# Patient Record
Sex: FEMALE | Race: WHITE | NOT HISPANIC OR LATINO | Employment: UNEMPLOYED | URBAN - METROPOLITAN AREA
[De-identification: names, ages, dates, MRNs, and addresses within clinical notes are randomized per-mention and may not be internally consistent; named-entity substitution may affect disease eponyms.]

---

## 2017-02-22 PROBLEM — M25.552 LEFT HIP PAIN: Status: ACTIVE | Noted: 2017-02-22

## 2017-02-22 PROBLEM — G43.109 OCULAR MIGRAINE: Status: ACTIVE | Noted: 2017-02-22

## 2017-03-01 ENCOUNTER — TELEPHONE (OUTPATIENT)
Dept: NEUROLOGY | Facility: CLINIC | Age: 62
End: 2017-03-01

## 2017-03-01 NOTE — TELEPHONE ENCOUNTER
----- Message from Irene Blanco sent at 3/1/2017 12:43 PM CST -----  Contact: self   Patient wants to speak with a nurse regarding scheduling appointment please call back at [phone]

## 2017-05-31 PROBLEM — Z01.419 WELL WOMAN EXAM WITH ROUTINE GYNECOLOGICAL EXAM: Status: ACTIVE | Noted: 2017-05-31

## 2017-06-08 ENCOUNTER — OFFICE VISIT (OUTPATIENT)
Dept: NEUROLOGY | Facility: CLINIC | Age: 62
End: 2017-06-08
Payer: OTHER GOVERNMENT

## 2017-06-08 DIAGNOSIS — H53.9 VISUAL DISTURBANCE: Primary | ICD-10-CM

## 2017-06-08 PROCEDURE — 99999 PR PBB SHADOW E&M-EST. PATIENT-LVL III: CPT | Mod: PBBFAC,,, | Performed by: PSYCHIATRY & NEUROLOGY

## 2017-06-08 PROCEDURE — 99204 OFFICE O/P NEW MOD 45 MIN: CPT | Mod: S$PBB,,, | Performed by: PSYCHIATRY & NEUROLOGY

## 2017-06-08 PROCEDURE — 99213 OFFICE O/P EST LOW 20 MIN: CPT | Mod: PBBFAC,PN | Performed by: PSYCHIATRY & NEUROLOGY

## 2017-06-08 RX ORDER — LANOLIN ALCOHOL/MO/W.PET/CERES
400 CREAM (GRAM) TOPICAL 2 TIMES DAILY
Qty: 60 TABLET | Refills: 12 | Status: SHIPPED | OUTPATIENT
Start: 2017-06-08

## 2017-06-08 NOTE — LETTER
June 8, 2017      AALIYAH Ramirez Jr., MD  [address] [facility] [address]  [facility] [address]           [facility]  [address] [facility] [address] [place] [address]  Phone: [phone]  Fax: [phone]          Patient: Kim Jean-Baptiste   MR Number: [id number]   YOB: 1955   Date of Visit: 6/8/2017       Dear Dr. AALIYAH Ramirez Jr.:    Thank you for referring Kim Jean-Baptiste to me for evaluation. Attached you will find relevant portions of my assessment and plan of care.    If you have questions, please do not hesitate to call me. I look forward to following Kim Jean-Baptiste along with you.    Sincerely,    Noreen Bonner MD    Enclosure  CC:  No Recipients    If you would like to receive this communication electronically, please contact [email or web address] or [phone] to request more information on [employer] access.    For providers and/or their staff who would like to refer a patient to [facility], please contact us through our one-stop-shop provider referral line, [facility], at [phone].    If you feel you have received this communication in error or would no longer like to receive these types of communications, please e-mail [email or web address]

## 2017-06-08 NOTE — PROGRESS NOTES
Subjective:       Patient ID: Kim Jean-Baptiste is a 62 y.o. female.    Chief Complaint: Visual disturbance    HPI  The patient is a very pleasant 63 y/o female presenting with chief complaint of monocular scintillation followed by scotomata or just scintillating phenomena that started almost 3 years ago. She was diagnosed with Ocular Migraine. Lately, the episodes have become more frequent, initially they were occurring 1 every couple of months and now 2 to 4 per month, they last up to 15 minutes. Often precipitated by driving. She does not have associated pain. Family history of migraine affecting her sister and daughter. No imaging studies.    Review of Systems   Constitutional: Negative for activity change, appetite change, fatigue and fever.   HENT: Negative for congestion, dental problem, hearing loss, sinus pressure, tinnitus, trouble swallowing and voice change.    Eyes: Positive for visual disturbance. Negative for photophobia, pain and redness.   Respiratory: Negative for cough, chest tightness and shortness of breath.    Cardiovascular: Negative for chest pain, palpitations and leg swelling.   Gastrointestinal: Positive for diarrhea. Negative for abdominal pain, blood in stool, nausea and vomiting.   Endocrine: Negative for cold intolerance and heat intolerance.   Genitourinary: Negative for difficulty urinating, frequency, menstrual problem and urgency.   Musculoskeletal: Positive for back pain and myalgias. Negative for arthralgias, gait problem, joint swelling, neck pain and neck stiffness.   Skin: Negative.    Neurological: Positive for dizziness. Negative for tremors, seizures, syncope, facial asymmetry, speech difficulty, weakness, light-headedness, numbness and headaches.   Hematological: Negative for adenopathy. Does not bruise/bleed easily.   Psychiatric/Behavioral: Negative for agitation, behavioral problems, confusion, decreased concentration, self-injury, sleep disturbance and suicidal ideas.  The patient is nervous/anxious. The patient is not hyperactive.          Past Medical History:   Diagnosis Date    Abnormal mammogram, unspecified     Asymptomatic varicose veins     Female stress incontinence     Generalized anxiety disorder     Hypertension     Irritable bowel syndrome     Nontoxic uninodular goiter     Other acute reactions to stress     Pancreatitis     Rosacea     Rosacea      Past Surgical History:   Procedure Laterality Date    APPENDECTOMY       SECTION      GALLBLADDER SURGERY      right shoulder       Family History   Problem Relation Age of Onset    Cancer Father      prostate, renal cell carcinoma    Asthma Father     Cancer Mother      cervical     Cancer Sister      ovarian , uterine, melanoma, breast     Heart disease Brother      Social History     Social History    Marital status:      Spouse name: N/A    Number of children: N/A    Years of education: N/A     Occupational History    Not on file.     Social History Main Topics    Smoking status: Never Smoker    Smokeless tobacco: Not on file    Alcohol use 0.6 - 1.2 oz/week     1 - 2 Glasses of wine per week    Drug use: No    Sexual activity: Not on file     Other Topics Concern    Not on file     Social History Narrative    No narrative on file     Review of patient's allergies indicates:   Allergen Reactions    Innovar      Other reaction(s): hallucinated. no longer on market per pt    Morphine        Current Outpatient Prescriptions:     alprazolam (XANAX) 0.5 MG tablet, Take 1 tablet (0.5 mg total) by mouth 3 (three) times daily as needed for Anxiety., Disp: 270 tablet, Rfl: 1    atenolol (TENORMIN) 50 MG tablet, TAKE 1 TABLET DAILY, Disp: 90 tablet, Rfl: 3    baclofen (LIORESAL) 10 MG tablet, 1-2 four times a day as needed for muscle spasm, Disp: 120 tablet, Rfl: 3    calcium carbonate 1250 MG capsule, Take 1,250 mg by mouth 2 (two) times daily with meals., Disp: , Rfl:      dicyclomine (BENTYL) 10 MG capsule, Take 10 mg by mouth. 1 to 2 capsules  4 times daily prn GI upset, Disp: , Rfl:     escitalopram oxalate (LEXAPRO) 10 MG tablet, TAKE 1 TABLET DAILY, Disp: 90 tablet, Rfl: 3    fish oil-omega-3 fatty acids 300-1,000 mg capsule, Take 2 g by mouth once daily., Disp: , Rfl:     multivitamin capsule, Take 1 capsule by mouth once daily., Disp: , Rfl:     naproxen sodium (ANAPROX DS) 550 MG tablet, Take 1 tablet (550 mg total) by mouth every 12 (twelve) hours as needed., Disp: 60 tablet, Rfl: 2      Objective:      Vitals:    06/08/17 1055   Resp: 18   Temp: 98.6 °F (37 °C)     Body mass index is 27.47 kg/m².    Physical Exam      Constitutional:   She appears well-developed and well-nourished. She is well groomed    HENT:    Head: Atraumatic, oral and nasal mucosa intact  Eyes: Conjunctivae and EOM are normal. Pupils are equal, round, and reactive to light OU  Neck: Neck supple. No thyromegaly present  Cardiovascular: Normal rate and normal heart sounds  No murmur heard  Pulmonary/Chest: Effort normal and breath sounds normal  Musculoskeletal: Normal range of motion. No joint stiffness. No vertebral point tenderness  Skin: Skin is warm and dry  Psychiatric: Normal mood and affect     Neuro exam:    Mental status:  Awake, attentive, Alert, oriented to self, place, year and month  Language function is intact    Cranial Nerves:  Smell was not formally evaluated  Cranial Nerves II - XII: intact  Pursuits were smooth, normal saccades, no nystagmus OU  Funduscopic exam - disc were flat and pink, no exudates or hemorrhages OU  Motor - facial movement was symmetrical and normal     Palate moved well and was symmetrical with normal palatal and oral sensation  Tongue movements were full    Coordination:     Rapid alternating movements and rapid finger tapping - normal bilaterally  Finger to nose - normal and symmetric bilaterally   Heel to shin test - normal and symmetric bilaterally   Arm  roll - smooth and symmetric   No intentional or positional tremor.     Motor:  Normal muscle bulk and symmetry. No fasciculations were noted    No pronator drift  Strength 5/5 bilaterally except left  4/5    Reflexes:  Tendon reflexes were 2 + at biceps, triceps, brachioradialis, patellar, and Achilles bilaterally  On plantar stimulation toes were down going bilaterally  No clonus was noted     Sensory: Intact to light touch, pin prick in all extremities.   Gait: Romberg elicited mild swaying. Normal gait. Normal arm swing and turns. Fair tandem    Review of Data: CBC CMP TSH all normal          Assessment and Plan     Retinal migraine, characterized by repeated attacks of monocular scotomata alternating sides preceded by scintillation lasting less than one hour increasing in frequency and duration. Occasionally the onset may be abrupt and difficult to distinguish from amaurosis fugax, therefore I will obtain carotid ultrasound in addition to MRI of the brain W WO contrast.  I have personally had a lot of success with Magnesium in reducing both, the number of attacks and the duration as well. I will start her on Magnesium 400 mg BID  RTC after tests completed    I have discussed the side effects of the medications prescribed and the patient acknowledges understanding    Daylin Bonner M.D  Medical Director, Headache and Facial Pain  [facility]

## 2017-06-16 ENCOUNTER — TELEPHONE (OUTPATIENT)
Dept: NEUROLOGY | Facility: CLINIC | Age: 62
End: 2017-06-16

## 2017-06-16 VITALS — RESPIRATION RATE: 18 BRPM | HEIGHT: 69 IN

## 2017-06-16 NOTE — TELEPHONE ENCOUNTER
Spoke with the pt, she informed me that her weight nor her temp was taken at last visit but there was a weight and temp on her visit summary. I apologized and informed her it must have been entered in error. Verified the pt had only AVS with her name. I assured her this would not happen again and that I would speak to the person responsible. She verbalized understanding and was content.

## 2017-06-16 NOTE — TELEPHONE ENCOUNTER
----- Message from Freda Fajardo sent at 6/16/2017  2:59 PM CDT -----  Contact: Kim  Patient is calling regarding survey. States she would rather speak with a supervisor, [phone] thanks!

## 2017-06-19 ENCOUNTER — LAB VISIT (OUTPATIENT)
Dept: LAB | Facility: HOSPITAL | Age: 62
End: 2017-06-19
Attending: PSYCHIATRY & NEUROLOGY
Payer: OTHER GOVERNMENT

## 2017-06-19 DIAGNOSIS — H53.9 VISUAL DISTURBANCE: ICD-10-CM

## 2017-06-19 LAB
CREAT SERPL-MCNC: 0.9 MG/DL
EST. GFR  (AFRICAN AMERICAN): >60 ML/MIN/1.73 M^2
EST. GFR  (NON AFRICAN AMERICAN): >60 ML/MIN/1.73 M^2

## 2017-06-19 PROCEDURE — 36415 COLL VENOUS BLD VENIPUNCTURE: CPT | Mod: PO

## 2017-06-19 PROCEDURE — 82565 ASSAY OF CREATININE: CPT

## 2017-06-21 ENCOUNTER — HOSPITAL ENCOUNTER (OUTPATIENT)
Dept: RADIOLOGY | Facility: HOSPITAL | Age: 62
Discharge: HOME | End: 2017-06-21
Attending: PSYCHIATRY & NEUROLOGY
Payer: OTHER GOVERNMENT

## 2017-06-21 DIAGNOSIS — H53.9 VISUAL DISTURBANCE: ICD-10-CM

## 2017-06-21 PROCEDURE — 93880 EXTRACRANIAL BILAT STUDY: CPT | Mod: TC,PO

## 2017-06-21 PROCEDURE — 93880 EXTRACRANIAL BILAT STUDY: CPT | Mod: 26,,, | Performed by: RADIOLOGY

## 2017-06-21 PROCEDURE — 25500020 PHARM REV CODE 255: Mod: PO | Performed by: PSYCHIATRY & NEUROLOGY

## 2017-06-21 PROCEDURE — 70553 MRI BRAIN STEM W/O & W/DYE: CPT | Mod: TC,PO

## 2017-06-21 PROCEDURE — 70553 MRI BRAIN STEM W/O & W/DYE: CPT | Mod: 26,,, | Performed by: RADIOLOGY

## 2017-06-21 PROCEDURE — A9585 GADOBUTROL INJECTION: HCPCS | Mod: PO | Performed by: PSYCHIATRY & NEUROLOGY

## 2017-06-21 RX ORDER — GADOBUTROL 604.72 MG/ML
7 INJECTION INTRAVENOUS
Status: COMPLETED | OUTPATIENT
Start: 2017-06-21 | End: 2017-06-21

## 2017-06-21 RX ADMIN — GADOBUTROL 7 ML: 604.72 INJECTION INTRAVENOUS at 07:06

## 2017-09-11 ENCOUNTER — OFFICE VISIT (OUTPATIENT)
Dept: NEUROLOGY | Facility: CLINIC | Age: 62
End: 2017-09-11
Payer: OTHER GOVERNMENT

## 2017-09-11 VITALS
SYSTOLIC BLOOD PRESSURE: 109 MMHG | BODY MASS INDEX: 25.7 KG/M2 | RESPIRATION RATE: 20 BRPM | DIASTOLIC BLOOD PRESSURE: 69 MMHG | HEART RATE: 66 BPM | WEIGHT: 173.5 LBS | HEIGHT: 69 IN

## 2017-09-11 DIAGNOSIS — G43.109 RETINAL MIGRAINE: Primary | ICD-10-CM

## 2017-09-11 PROCEDURE — 3078F DIAST BP <80 MM HG: CPT | Mod: ,,, | Performed by: PSYCHIATRY & NEUROLOGY

## 2017-09-11 PROCEDURE — 3008F BODY MASS INDEX DOCD: CPT | Mod: ,,, | Performed by: PSYCHIATRY & NEUROLOGY

## 2017-09-11 PROCEDURE — 99213 OFFICE O/P EST LOW 20 MIN: CPT | Mod: PBBFAC,PN | Performed by: PSYCHIATRY & NEUROLOGY

## 2017-09-11 PROCEDURE — 3074F SYST BP LT 130 MM HG: CPT | Mod: ,,, | Performed by: PSYCHIATRY & NEUROLOGY

## 2017-09-11 PROCEDURE — 99214 OFFICE O/P EST MOD 30 MIN: CPT | Mod: S$PBB,,, | Performed by: PSYCHIATRY & NEUROLOGY

## 2017-09-11 PROCEDURE — 99999 PR PBB SHADOW E&M-EST. PATIENT-LVL III: CPT | Mod: PBBFAC,,, | Performed by: PSYCHIATRY & NEUROLOGY

## 2017-09-11 RX ORDER — ONDANSETRON HYDROCHLORIDE 8 MG/1
8 TABLET, FILM COATED ORAL DAILY PRN
Refills: 0 | COMMUNITY
Start: 2017-08-16

## 2017-09-11 RX ORDER — ESTRADIOL 10 UG/1
10 TABLET VAGINAL DAILY
Refills: 3 | COMMUNITY
Start: 2017-08-07

## 2017-09-11 RX ORDER — PROGESTERONE 200 MG/1
200 CAPSULE ORAL
Refills: 4 | COMMUNITY
Start: 2017-08-07

## 2017-09-11 RX ORDER — PROMETHAZINE HYDROCHLORIDE 25 MG/1
25 SUPPOSITORY RECTAL
Refills: 0 | COMMUNITY
Start: 2017-08-16 | End: 2018-12-17

## 2017-09-11 NOTE — PROGRESS NOTES
Subjective:       Patient ID: Kim Jean-Baptiste is a 62 y.o. female.    Chief Complaint: Visual disturbance  INTERVAL HISTORY  Ever since she started magnesium, she only had 2 attacks the first week and another one the week after. Ever since, no further attacks. She has recently sustained a fall in [place] when she fell on wet floor.  MRI W WO negative for significant abnormality. Carotid US, no significant stenosis.  HPI  The patient is a very pleasant 61 y/o female presenting with chief complaint of monocular scintillation followed by scotomata or just scintillating phenomena that started almost 3 years ago. She was diagnosed with Ocular Migraine. Lately, the episodes have become more frequent, initially they were occurring 1 every couple of months and now 2 to 4 per month, they last up to 15 minutes. Often precipitated by driving. She does not have associated pain. Family history of migraine affecting her sister and daughter. No imaging studies.    Review of Systems   Constitutional: Negative for activity change, appetite change, fatigue and fever.   HENT: Negative for congestion, dental problem, hearing loss, sinus pressure, tinnitus, trouble swallowing and voice change.    Eyes: Positive for visual disturbance. Negative for photophobia, pain and redness.   Respiratory: Negative for cough, chest tightness and shortness of breath.    Cardiovascular: Negative for chest pain, palpitations and leg swelling.   Gastrointestinal: Positive for diarrhea. Negative for abdominal pain, blood in stool, nausea and vomiting.   Endocrine: Negative for cold intolerance and heat intolerance.   Genitourinary: Negative for difficulty urinating, frequency, menstrual problem and urgency.   Musculoskeletal: Positive for back pain and myalgias. Negative for arthralgias, gait problem, joint swelling, neck pain and neck stiffness.   Skin: Negative.    Neurological: Positive for dizziness. Negative for tremors, seizures, syncope, facial  asymmetry, speech difficulty, weakness, light-headedness, numbness and headaches.   Hematological: Negative for adenopathy. Does not bruise/bleed easily.   Psychiatric/Behavioral: Negative for agitation, behavioral problems, confusion, decreased concentration, self-injury, sleep disturbance and suicidal ideas. The patient is nervous/anxious. The patient is not hyperactive.          Past Medical History:   Diagnosis Date    Abnormal mammogram, unspecified     Asymptomatic varicose veins     Female stress incontinence     Generalized anxiety disorder     Hypertension     Irritable bowel syndrome     Nontoxic uninodular goiter     Other acute reactions to stress     Pancreatitis     Rosacea     Rosacea      Past Surgical History:   Procedure Laterality Date    APPENDECTOMY       SECTION      GALLBLADDER SURGERY      right shoulder       Family History   Problem Relation Age of Onset    Cancer Father      prostate, renal cell carcinoma    Asthma Father     Cancer Mother      cervical     Cancer Sister      ovarian , uterine, melanoma, breast     Heart disease Brother      Social History     Social History    Marital status:      Spouse name: N/A    Number of children: N/A    Years of education: N/A     Occupational History    Not on file.     Social History Main Topics    Smoking status: Never Smoker    Smokeless tobacco: Not on file    Alcohol use 0.6 - 1.2 oz/week     1 - 2 Glasses of wine per week    Drug use: No    Sexual activity: Not on file     Other Topics Concern    Not on file     Social History Narrative    No narrative on file     Review of patient's allergies indicates:   Allergen Reactions    Innovar      Other reaction(s): hallucinated. no longer on market per pt    Morphine        Current Outpatient Prescriptions:     alprazolam (XANAX) 0.5 MG tablet, Take 1 tablet (0.5 mg total) by mouth 3 (three) times daily as needed for Anxiety., Disp: 270 tablet, Rfl:  1    atenolol (TENORMIN) 50 MG tablet, TAKE 1 TABLET DAILY, Disp: 90 tablet, Rfl: 3    baclofen (LIORESAL) 10 MG tablet, 1-2 four times a day as needed for muscle spasm, Disp: 120 tablet, Rfl: 3    calcium carbonate 1250 MG capsule, Take 1,250 mg by mouth 2 (two) times daily with meals., Disp: , Rfl:     dicyclomine (BENTYL) 10 MG capsule, Take 10 mg by mouth. 1 to 2 capsules  4 times daily prn GI upset, Disp: , Rfl:     escitalopram oxalate (LEXAPRO) 10 MG tablet, TAKE 1 TABLET DAILY, Disp: 90 tablet, Rfl: 3    fish oil-omega-3 fatty acids 300-1,000 mg capsule, Take 2 g by mouth once daily., Disp: , Rfl:     multivitamin capsule, Take 1 capsule by mouth once daily., Disp: , Rfl:     naproxen sodium (ANAPROX DS) 550 MG tablet, Take 1 tablet (550 mg total) by mouth every 12 (twelve) hours as needed., Disp: 60 tablet, Rfl: 2      Objective:      Vitals:    09/11/17 1330   BP: 109/69   Pulse: 66   Resp: 20     Body mass index is 25.62 kg/m².    Physical Exam    Constitutional:   She appears well-developed and well-nourished. She is well groomed    HENT:    Head: Atraumatic, oral and nasal mucosa intact  Eyes: Conjunctivae and EOM are normal. Pupils are equal, round, and reactive to light OU  Neck: Neck supple. No thyromegaly present  Cardiovascular: Normal rate and normal heart sounds  No murmur heard  Pulmonary/Chest: Effort normal and breath sounds normal  Musculoskeletal: Normal range of motion. No joint stiffness. No vertebral point tenderness  Skin: Skin is warm and dry  Psychiatric: Normal mood and affect     Neuro exam:    Mental status:  Awake, attentive, Alert, oriented to self, place, year and month  Language function is intact    Cranial Nerves:  Smell was not formally evaluated  Cranial Nerves II - XII: intact  Pursuits were smooth, normal saccades, no nystagmus OU  Funduscopic exam - disc were flat and pink, no exudates or hemorrhages OU  Motor - facial movement was symmetrical and normal      Palate moved well and was symmetrical with normal palatal and oral sensation  Tongue movements were full    Coordination:     Rapid alternating movements and rapid finger tapping - normal bilaterally  Finger to nose - normal and symmetric bilaterally   Heel to shin test - normal and symmetric bilaterally   Arm roll - smooth and symmetric   No intentional or positional tremor.     Motor:  Normal muscle bulk and symmetry. No fasciculations were noted    No pronator drift  Strength 5/5 bilaterally except left  4/5    Reflexes:  Tendon reflexes were 2 + at biceps, triceps, brachioradialis, patellar, and Achilles bilaterally  On plantar stimulation toes were down going bilaterally  No clonus was noted     Sensory: Intact to light touch, pin prick in all extremities.   Gait: Romberg elicited mild swaying. Normal gait. Normal arm swing and turns. Fair tandem    Review of Data: CBC CMP TSH all normal  .[email or web address]  Results for orders placed or performed during the hospital encounter of 06/21/17   MRI Brain W WO Contrast    Narrative    Routine multiplanar imaging through this 62-year-old females brain was obtained with utilization of 7 cc of gadavist contrast.    Mucous membrane thickening is noted along the floor of the left maxillary sinus.    The Ventricles and sulci appear age-appropriate.    No diffusion positivity is identified to suggest recent infarction.    Several punctate foci of flair signal abnormality are noted likely within normal limits for a patient this age. Nonspecific white matter foci can be seen with microvascular ischemic changes as well as with a history of migraines.    No gradient susceptibility was noted to suggest the presence of acute blood products.    No worrisome enhancing lesions are noted upon administration of contrast.    Impression     Several nonspecific foci of white matter signal abnormality likely within normal limits for a patient this age. No acute intercranial process is  convincingly noted.    Electronically signed by: Esau Barrientos MD  Date:     06/21/17  Time:    08:22          Assessment and Plan     Retinal migraine, characterized by repeated attacks of monocular scotomata alternating sides preceded by scintillation lasting less than one hour increasing in frequency and duration. Resolved with Magnesium replacement  Carotid ultrasound: minimal plaque, no significant stenosis  MRI of the brain W WO contrast negative for significant abnormality  I have personally had a lot of success with Magnesium in reducing both, the number of attacks and the duration as well. I will start her on Magnesium 400 mg BID  RTC in 6 months    I have discussed the side effects of the medications prescribed and the patient acknowledges understanding    Daylin Bonner M.D  Medical Director, Headache and Facial Pain  [facility]

## 2019-04-18 PROBLEM — F41.1 GENERALIZED ANXIETY DISORDER: Status: ACTIVE | Noted: 2019-04-18

## 2019-08-14 PROBLEM — R13.10 DYSPHAGIA: Status: ACTIVE | Noted: 2019-08-14
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
## 2022-07-31 PROBLEM — F41.1 GENERALIZED ANXIETY DISORDER: Chronic | Status: ACTIVE | Noted: 2019-04-18

## 2023-07-27 PROBLEM — R13.10 DYSPHAGIA: Chronic | Status: ACTIVE | Noted: 2019-08-14

## 2023-12-05 PROBLEM — G43.109 OCULAR MIGRAINE: Chronic | Status: ACTIVE | Noted: 2017-02-22
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
